# Patient Record
Sex: MALE | URBAN - METROPOLITAN AREA
[De-identification: names, ages, dates, MRNs, and addresses within clinical notes are randomized per-mention and may not be internally consistent; named-entity substitution may affect disease eponyms.]

---

## 2019-06-21 ENCOUNTER — OFFICE VISIT (OUTPATIENT)
Dept: URBAN - METROPOLITAN AREA CLINIC 16 | Facility: CLINIC | Age: 59
End: 2019-06-21
Payer: MEDICARE

## 2019-06-21 DIAGNOSIS — H18.12 BULLOUS KERATOPATHY, LEFT EYE: ICD-10-CM

## 2019-06-21 DIAGNOSIS — H26.492 OTHER SECONDARY CATARACT, LEFT EYE: Primary | ICD-10-CM

## 2019-06-21 PROCEDURE — 99204 OFFICE O/P NEW MOD 45 MIN: CPT | Performed by: OPHTHALMOLOGY

## 2019-06-21 RX ORDER — FLUOROMETHOLONE 1 MG/ML
0.1 % SOLUTION/ DROPS OPHTHALMIC
Qty: 5 | Refills: 5 | Status: ACTIVE
Start: 2019-06-21

## 2019-06-21 ASSESSMENT — VISUAL ACUITY
OD: 20/20
OS: CF 1FT

## 2019-06-21 ASSESSMENT — INTRAOCULAR PRESSURE
OS: 15
OD: 15

## 2019-06-21 ASSESSMENT — KERATOMETRY: OD: 44.50

## 2019-06-21 NOTE — IMPRESSION/PLAN
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 Condition: new prob, no addtl w/u needed.  Plan: fml 3x/wk OS